# Patient Record
Sex: MALE | Race: WHITE
[De-identification: names, ages, dates, MRNs, and addresses within clinical notes are randomized per-mention and may not be internally consistent; named-entity substitution may affect disease eponyms.]

---

## 2019-08-27 ENCOUNTER — HOSPITAL ENCOUNTER (EMERGENCY)
Dept: HOSPITAL 63 - ER | Age: 24
LOS: 1 days | Discharge: HOME | End: 2019-08-28
Payer: SELF-PAY

## 2019-08-27 VITALS — SYSTOLIC BLOOD PRESSURE: 121 MMHG | DIASTOLIC BLOOD PRESSURE: 75 MMHG

## 2019-08-27 DIAGNOSIS — M54.5: ICD-10-CM

## 2019-08-27 DIAGNOSIS — R07.89: ICD-10-CM

## 2019-08-27 DIAGNOSIS — R11.11: ICD-10-CM

## 2019-08-27 DIAGNOSIS — M79.10: Primary | ICD-10-CM

## 2019-08-27 DIAGNOSIS — Z88.8: ICD-10-CM

## 2019-08-27 LAB
ALBUMIN SERPL-MCNC: 5.1 G/DL (ref 3.4–5)
ALBUMIN/GLOB SERPL: 1.7 {RATIO} (ref 1–1.7)
ALP SERPL-CCNC: 79 U/L (ref 46–116)
ALT SERPL-CCNC: 27 U/L (ref 16–63)
AMORPH SED URNS QL MICRO: PRESENT /HPF
AMPHETAMINE/METHAMPHETAMINE: (no result)
ANION GAP SERPL CALC-SCNC: 13 MMOL/L (ref 6–14)
APTT PPP: YELLOW S
AST SERPL-CCNC: 15 U/L (ref 15–37)
BACTERIA #/AREA URNS HPF: 0 /HPF
BARBITURATES UR-MCNC: (no result) UG/ML
BASOPHILS # BLD AUTO: 0.1 X10^3/UL (ref 0–0.2)
BASOPHILS NFR BLD: 1 % (ref 0–3)
BENZODIAZ UR-MCNC: (no result) UG/L
BILIRUB SERPL-MCNC: 1.1 MG/DL (ref 0.2–1)
BILIRUB UR QL STRIP: (no result)
BUN/CREAT SERPL: 8 (ref 6–20)
CA-I SERPL ISE-MCNC: 8 MG/DL (ref 8–26)
CALCIUM SERPL-MCNC: 10.2 MG/DL (ref 8.5–10.1)
CANNABINOIDS UR-MCNC: (no result) UG/L
CHLORIDE SERPL-SCNC: 98 MMOL/L (ref 98–107)
CO2 SERPL-SCNC: 28 MMOL/L (ref 21–32)
COCAINE UR-MCNC: (no result) NG/ML
CREAT SERPL-MCNC: 1 MG/DL (ref 0.7–1.3)
EOSINOPHIL NFR BLD: 0.1 X10^3/UL (ref 0–0.7)
EOSINOPHIL NFR BLD: 1 % (ref 0–3)
ERYTHROCYTE [DISTWIDTH] IN BLOOD BY AUTOMATED COUNT: 12.3 % (ref 11.5–14.5)
FIBRINOGEN PPP-MCNC: (no result) MG/DL
GFR SERPLBLD BASED ON 1.73 SQ M-ARVRAT: 93.4 ML/MIN
GLOBULIN SER-MCNC: 3 G/DL (ref 2.2–3.8)
GLUCOSE SERPL-MCNC: 110 MG/DL (ref 70–99)
GLUCOSE UR STRIP-MCNC: (no result) MG/DL
HCT VFR BLD CALC: 48.1 % (ref 39–53)
HGB BLD-MCNC: 16.7 G/DL (ref 13–17.5)
HYALINE CASTS #/AREA URNS LPF: (no result) /HPF
LIPASE: 103 U/L (ref 73–393)
LYMPHOCYTES # BLD: 2 X10^3/UL (ref 1–4.8)
LYMPHOCYTES NFR BLD AUTO: 24 % (ref 24–48)
MCH RBC QN AUTO: 31 PG (ref 25–35)
MCHC RBC AUTO-ENTMCNC: 35 G/DL (ref 31–37)
MCV RBC AUTO: 89 FL (ref 79–100)
METHADONE SERPL-MCNC: (no result) NG/ML
MONO #: 1 X10^3/UL (ref 0–1.1)
MONOCYTES NFR BLD: 12 % (ref 0–9)
NEUT #: 4.9 X10^3UL (ref 1.8–7.7)
NEUTROPHILS NFR BLD AUTO: 62 % (ref 31–73)
NITRITE UR QL STRIP: (no result)
OPIATES UR-MCNC: (no result) NG/ML
PCP SERPL-MCNC: (no result) MG/DL
PLATELET # BLD AUTO: 277 X10^3/UL (ref 140–400)
POTASSIUM SERPL-SCNC: 3.1 MMOL/L (ref 3.5–5.1)
PROT SERPL-MCNC: 8.1 G/DL (ref 6.4–8.2)
RBC # BLD AUTO: 5.38 X10^6/UL (ref 4.3–5.7)
RBC #/AREA URNS HPF: 0 /HPF (ref 0–2)
SODIUM SERPL-SCNC: 139 MMOL/L (ref 136–145)
SP GR UR STRIP: 1.02
SQUAMOUS #/AREA URNS LPF: (no result) /LPF
UROBILINOGEN UR-MCNC: 0.2 MG/DL
WBC # BLD AUTO: 8 X10^3/UL (ref 4–11)
WBC #/AREA URNS HPF: (no result) /HPF (ref 0–4)

## 2019-08-27 PROCEDURE — 36415 COLL VENOUS BLD VENIPUNCTURE: CPT

## 2019-08-27 PROCEDURE — 83690 ASSAY OF LIPASE: CPT

## 2019-08-27 PROCEDURE — 80053 COMPREHEN METABOLIC PANEL: CPT

## 2019-08-27 PROCEDURE — 84484 ASSAY OF TROPONIN QUANT: CPT

## 2019-08-27 PROCEDURE — 80307 DRUG TEST PRSMV CHEM ANLYZR: CPT

## 2019-08-27 PROCEDURE — 85025 COMPLETE CBC W/AUTO DIFF WBC: CPT

## 2019-08-27 PROCEDURE — 82550 ASSAY OF CK (CPK): CPT

## 2019-08-27 PROCEDURE — 81001 URINALYSIS AUTO W/SCOPE: CPT

## 2019-08-27 PROCEDURE — 96360 HYDRATION IV INFUSION INIT: CPT

## 2019-08-27 PROCEDURE — 93005 ELECTROCARDIOGRAM TRACING: CPT

## 2019-08-27 PROCEDURE — 71045 X-RAY EXAM CHEST 1 VIEW: CPT

## 2019-08-27 PROCEDURE — 99285 EMERGENCY DEPT VISIT HI MDM: CPT

## 2019-08-27 NOTE — EKG
[facility] [address], [place] [address]

Test Date:    2019               Test Time:    22:21:42

Pat Name:     BRIELLE WONG          Department:   

Patient ID:   [id number]           Room:          

Gender:       M                        Technician:   

:          1995               Requested By: VILMA ROWAN

Order Number: [id number]            Reading MD:   Dash Perez MD

                                 Measurements

Intervals                              Axis          

Rate:         67                       P:            54

PA:           140                      QRS:          42

QRSD:         90                       T:            36

QT:           360                                    

QTc:          383                                    

                           Interpretive Statements

SINUS RHYTHM



Electronically Signed On 2019 8:15:37 CDT by Dash Perez MD

## 2019-08-27 NOTE — PHYS DOC
Adult General


Chief Complaint


Chief Complaint:  FLANK PAIN





HPI


HPI





Patient is a 24 YO M WITH CC OF B/L LOW BACK PAIN


CHEST PAIN FEELS LIKE "SWOLLEN" INTRMITTENT VOMITING


MYALGIAS ALL OVER.


NO FEVER


WENT TO [facility], APPPARENTLY WAS TOLD HE HAD HEAD STROKE OR KIDNEY 

FAILURE, GOT HYDRATED AND APPARENTLY GOT HALDOL POSSIBLY FOR CANNABIS 

HYPEREMESIS ? SYNDROME, SAYS THE VOMITING IS BETTER AFTER A HOT BATH.





I AM WAIITN FOR RECORDS AS OF THIS TIME, FAX'D THE PAPERS OVER TO [facility].





PT SAYS HE DRANK TEN GLASSES OF WATER AND BARELY PEED AT ALL ALL DAY


IN THE ER THE PATIENT IS ABLE TO MAKE URINE.





ALSO THERE IS SOME ISSUES WITH THE CHILD AND THE COURT SYSTEM, PT UNDER STRES.





Review of Systems


Review of Systems





Constitutional: Denies fever or chills []


Eyes: Denies change in visual acuity, redness, or eye pain []


HENT: Denies nasal congestion or sore throat []


Respiratory: Denies cough or shortness of breath []





All other systems were reviewed and found to be within normal limits, except as 

documented in this note.





Current Medications


Current Medications





Current Medications








 Medications


  (Trade)  Dose


 Ordered  Sig/Raheem  Start Time


 Stop Time Status Last Admin


Dose Admin


 


 Sodium Chloride  1,000 ml @ 


 1,000 mls/hr  1X  ONCE  8/27/19 22:15


 8/27/19 23:14 UNV 8/27/19 22:18


1,000 MLS/HR











Allergies


Allergies





Allergies








Coded Allergies Type Severity Reaction Last Updated Verified


 


  haloperidol Allergy Unknown  8/27/19 Yes











Physical Exam


Physical Exam





Constitutional: Well developed, well nourished, no acute distress, non-toxic 

appearance. []


HENT: Normocephalic, atraumatic, bilateral external ears normal, oropharynx 

moist, no oral exudates, nose normal. []


Eyes: PERRLA, EOMI, conjunctiva normal, no discharge. [] 


Neck: Normal range of motion, no tenderness, supple, no stridor. [] 


Cardiovascular:Heart rate regular rhythm, no murmur []


Lungs & Thorax:  Bilateral breath sounds clear to auscultation []


Abdomen: Bowel sounds normal, soft, no tenderness, no masses, no pulsatile 

masses. [] 


Skin: Warm, dry, no erythema, no rash. [] 


Back: No tenderness, no CVA tenderness. [] 


Extremities: No tenderness, no cyanosis, no clubbing, ROM intact, no edema. [] 


Neurologic: Alert and oriented X 3, normal motor function, normal sensory 

function, no focal deficits noted. []


Psychologic: Affect normal, judgement normal, mood normal. []





EKG


EKG


nsr rate 67 no acute ischemic chagnes noted. given age liekly normal[]





Radiology/Procedures


Radiology/Procedures


[]


Impressions:


CXR NEG ACUTE MY READ





Course & Med Decision Making


Course & Med Decision Making


Pertinent Labs and Imaging studies reviewed. (See chart for details)





[]23-year-old male with multiple nonspecific symptoms back pain myalgias chest 

pressure feels like the chest is swollen intermittent vomiting vitals are 

reassuring EKG normal chest x-ray negative labs look good cannabis in system. 

Perhaps there is a component of some symptoms related to that recommended 

hydration exercise abstain from cannabis follow-up precautions reviewed. There 

is no evidence of kidney failure at this time





Dragon Disclaimer


Dragon Disclaimer


This electronic medical record was generated, in whole or in part, using a voice

 recognition dictation system.





Departure


Departure:


Impression:  


   Primary Impression:  


   Myalgia


Disposition:  01 HOME, SELF-CARE


Condition:  STABLE


Referrals:  


PCP,NO (PCP)











VILMA ROWAN MD            Aug 27, 2019 22:21

## 2019-08-27 NOTE — RAD
CHEST AP ONLY 

 

INDICATION: Chest pain. 

 

COMPARISON STUDY: None.

 

FINDINGS:

 

Lungs: Normal lung volume. No pulmonary mass or consolidation. The 

tracheobronchial tree and hilar structures are normal.

 

Pleura: No pleural effusion or pneumothorax.

 

Heart and Mediastinum: The cardiomediastinal silhouette is normal. The 

great vessels of the thorax are normal.

 

Bones and Soft Tissues: The bones and soft tissues are within normal 

limits.

 

IMPRESSION:  

No acute cardiopulmonary process.

 

Electronically signed by: Juan Gipson MD (8/27/2019 11:54 PM) 

[facility]